# Patient Record
Sex: MALE | URBAN - METROPOLITAN AREA
[De-identification: names, ages, dates, MRNs, and addresses within clinical notes are randomized per-mention and may not be internally consistent; named-entity substitution may affect disease eponyms.]

---

## 2020-09-14 ENCOUNTER — IMPORTED ENCOUNTER (OUTPATIENT)
Dept: URBAN - METROPOLITAN AREA CLINIC 1 | Facility: CLINIC | Age: 35
End: 2020-09-14

## 2020-09-14 PROBLEM — H52.13: Noted: 2020-09-14

## 2020-09-14 PROBLEM — H52.222: Noted: 2020-09-14

## 2020-09-14 PROCEDURE — S0620 ROUTINE OPHTHALMOLOGICAL EXA: HCPCS

## 2020-09-14 NOTE — PATIENT DISCUSSION
1. Myopia OU w/ Astigmatism OS -- Rx was given for correction if indicated and requested. 2. Dry Eyes OU -- Recommended the use of ATs BId OU routinely (Sample of Refresh for Contacts given). CTL trials given today. Return for an appointment in 1 week cc with Dr. Sharifa Norris.

## 2020-09-22 ENCOUNTER — IMPORTED ENCOUNTER (OUTPATIENT)
Dept: URBAN - METROPOLITAN AREA CLINIC 1 | Facility: CLINIC | Age: 35
End: 2020-09-22

## 2020-09-22 NOTE — PATIENT DISCUSSION
1. CC Today -- Finalized CTL Rx today. Good comfort fit and vision. Return for an appointment in 1 year 40/cc with Dr. Carina Godinez.

## 2022-04-02 ASSESSMENT — KERATOMETRY
OS_AXISANGLE_DEGREES: 161
OD_K1POWER_DIOPTERS: 42.50
OS_K2POWER_DIOPTERS: 43.25
OD_AXISANGLE_DEGREES: 160
OD_AXISANGLE2_DEGREES: 070
OD_K2POWER_DIOPTERS: 43.25
OS_AXISANGLE2_DEGREES: 071
OS_K1POWER_DIOPTERS: 42.75

## 2022-04-02 ASSESSMENT — VISUAL ACUITY
OS_SC: 20/20
OS_CC: J1+
OS_SC: 20/20
OD_CC: J1+
OD_SC: 20/20
OD_SC: 20/20
OD_CC: 20/400
OU_SC: 20/20

## 2022-04-02 ASSESSMENT — TONOMETRY
OS_IOP_MMHG: 13
OD_IOP_MMHG: 13